# Patient Record
Sex: MALE | Race: WHITE
[De-identification: names, ages, dates, MRNs, and addresses within clinical notes are randomized per-mention and may not be internally consistent; named-entity substitution may affect disease eponyms.]

---

## 2017-07-12 NOTE — EKG
[facility]
                                    [address] [facility] [address]
                                  [place]  [address]
_________________________________________________________________________________________
                                                                 Signed   
 
 
Sinus rhythm with frequent premature ventricular complexes
Nonspecific ST abnormality
Abnormal ECG
When compared with ECG of 05-JUL-2017 10:16,
No significant change was found
Confirmed by BOBBY SOTO MD ([id number]) on 7/12/2017 6:23:32 PM
 
 
 
 
 
 
 
 
 
 
 
 
 
 
 
 
 
 
 
 
 
 
 
 
 
 
 
 
 
 
 
 
 
 
 
 
 
 
 
    Electronically Signed By: BOBBY SOTO MD  07/12/17 1823
_________________________________________________________________________________________
PATIENT NAME:     JUNERA LANDRY               
MEDICAL RECORD #: [id number]                     Electrocardiogram             
          ACCT #: [id number]  
DATE OF BIRTH:   [birth year]                                       
PHYSICIAN:   BOBBY SOTO MD           REPORT #: [id number]
REPORT IS CONFIDENTIAL AND NOT TO BE RELEASED WITHOUT AUTHORIZATION

## 2017-12-20 NOTE — OR
[facility]
                                    [address] [place], [place]  [address]
_________________________________________________________________________________________
                                                                 Signed   
 
 
DATE OF OPERATION:
12/19/2017
 
SURGEON:
Chidi Duke MD
 
PREOPERATIVE DIAGNOSIS:
End-stage osteoarthritis, right knee.
 
POSTOPERATIVE DIAGNOSIS:
End-stage osteoarthritis, right knee.
 
PROCEDURE:
Right total knee arthroplasty.
 
IMPLANTS:
Were an Attune PS femur size 7, size 7 cemented standard tibial tray with a size 7 PS 
poly insert, 41 mm patellar button.
 
ANESTHESIA:
Spinal with sedation.
 
SPECIMENS AND COMPLICATIONS:
There were no specimens or complications.
 
TOURNIQUET TIME:
90 minutes.
 
WHAT WAS DONE:
The patient was taken to the operating room, placed on the table in the supine position. 
After anesthesia was induced and the patient was sedated, the right lower extremity was 
positioned, prepped and draped in a routine sterile fashion.  A straight anterior 
approach was made centered over the patella and the tibial tubercle.  Skin was divided 
sharply.  A small medial flap was created and an anteromedial arthrotomy performed.  The 
patella was turned on edge and about 11 mm were trimmed off the posterior aspect of the 
patella.  The patella was then sized with a 41 mm lollipop and the drill holes were made 
for 41 mm patella.  The patellar trial was placed and we had completely reconstituted his
patellar height.  The trial was then removed.  The patella was slid into the lateral 
recess and the knee was gently flexed.  The Dragonplay navigation system was then attached 
to the distal femur and following the prompts, we digitized the distal femur.  Distal 
femur was then resected in 3 degrees of flexion, neutral varus and valgus, and taking 
about 11 mm off the medial side.  The femoral wafers were then removed.  We then 
 
    Electronically Signed By: CHIDI DUKE MD  12/20/17 1325
_________________________________________________________________________________________
PATIENT NAME:     RA WATKINS               
MEDICAL RECORD #: [id number]                     OPERATIVE REPORT              
          ACCT #: [id number]  
DATE OF BIRTH:   [birth year]                                       
PHYSICIAN:   CHIDI DUKE MD           REPORT #: [id number]
REPORT IS CONFIDENTIAL AND NOT TO BE RELEASED WITHOUT AUTHORIZATION
 
 
                                  [facility]
                                    [address] [place], [place]  [address]
_________________________________________________________________________________________
                                                                 Signed   
 
 
transitioned the navigation system to proximal tibia and again following the prompts, 
digitized the proximal tibia.  Proximal tibia was then resected in neutral varus valgus, 
removing about 5 mm off the medial side and placing the cut in 3 degrees of posterior 
slope per the Attune protocol.  Tibial wafer was then removed along with remnants of the 
medial and lateral meniscus, ACL and PCL.  The knee was then gently irrigated.  Lamina 
spreaders were placed.  We appeared to have good balance in all the remaining portions of
the medial meniscus.  Lateral meniscus, ACL and PCL were removed.  The femoral sizing jig
was then placed on the distal femur and sized to a size 7.  The 4-in-1 cutting block was 
placed on the distal femur and anterior, posterior, and chamfer cuts were made.  We then 
placed the notch cutting block on the distal femur and cut out the notch.  We were then 
able to place the distal femoral trial on the femur and drilled the holes for the lugs.  
We were then able to fit a size 7 tibial tray on the proximal tibia with a 5 mm poly.  
The knee was then cycled several times.  Rotational alignment of the tibial tray was 
marked.  The tibia was then prepared with a standard broach and reamer.  We then 
copiously irrigated and meticulously dried the knee.  The tibial tray and femoral 
component were then cemented into place.  Marginal cement was removed and a size 5 PS 
poly trial was placed on the tray and the knee was reduced.  The patella was then 
cemented into place as well and marginal cement trimmed.  Once all the cement had cured, 
we reexamined the knee.  Marginal cementophytes were removed.  We then found, we were 
actually able to place a 7 mm poly and we were happy with alignment and position and did 
not appear sacrifice any extension.  A 7 mm poly was then placed on the tibial tray and 
impacted into place.  Knee was copiously irrigated and closed in a routine fashion.  
Sterile dressing was applied.  The patient was awakened and taken to recovery room where 
he arrived in stable condition.  Counts were correct and antibiotic protocols were 
followed.
 
 
 
 
________________________________________
 
 
Chidi Duke MD 
 
 
 
[facility]/MODL
Job #:  [id number]
DD:  12/19/2017 10:03:05
DT:  12/19/2017 12:02:33
 
 
 
    Electronically Signed By: CHIDI DUKE MD  12/20/17 1325
_________________________________________________________________________________________
PATIENT NAME:     RA WATKINS               
MEDICAL RECORD #: [id number]                     OPERATIVE REPORT              
          ACCT #: [id number]  
DATE OF BIRTH:   [birth year]                                       
PHYSICIAN:   CHIDI DUKE MD           REPORT #: [id number]
REPORT IS CONFIDENTIAL AND NOT TO BE RELEASED WITHOUT AUTHORIZATION
 
 
                                  [address]  [address]
_________________________________________________________________________________________
                                                                 Signed   
 
 
cc:
Stef Mcnamara MD
 
 
 
 
 
 
 
 
 
 
 
 
 
 
 
 
 
 
 
 
 
 
 
 
 
 
 
 
 
 
 
 
 
 
 
 
 
 
 
 
 
    Electronically Signed By: CHIDI DUKE MD  12/20/17 1325
_________________________________________________________________________________________
PATIENT NAME:     CHAROERINRA LANDRY               
MEDICAL RECORD #: [id number]                     OPERATIVE REPORT              
          ACCT #: [id number]  
DATE OF BIRTH:   [birth year]                                       
PHYSICIAN:   CHIDI DUKE MD           REPORT #: [id number]
REPORT IS CONFIDENTIAL AND NOT TO BE RELEASED WITHOUT AUTHORIZATION

## 2017-12-22 NOTE — DS
[facility]
                                    [address] [facility]
                                  [place], [place]  [address]
_________________________________________________________________________________________
                                                                 Signed   
 
 
ADMISSION DATE:  12/19/2017
 
DISCHARGE DATE:  12/21/2017
 
FINAL DIAGNOSIS AT THE TIME OF DISCHARGE:
End-stage osteoarthritis, right knee.
 
PROCEDURE:
Right total knee arthroplasty.
 
HISTORY OF PRESENT ILLNESS:
The patient is a 67-year-old white male with a long history of osteoarthritis in both 
knees.  He has already undergone a successful left total knee arthroplasty and because of
ongoing pain and disability presents for an elective right total knee arthroplasty.
 
HOSPITAL COURSE:
He was admitted to day surgery on the 19th of December.  He was taken to the operating 
room, where he underwent an Attune posterior stabilized knee with a size #7 PS femoral 
component, size #7 tibial tray, a 7 mm poly insert, and a 41 mm all poly patellar button.
 
 
Postoperatively, he has done well.  He had no issues with nausea or vomiting.  He was 
able to void without difficulty and has been getting good pain control alternating 
Vicodin and Dilaudid.  He has been taking his Eliquis, which he was taking preoperatively
for DVT prophylaxis. 
 
As of today, he has passed all of his therapy milestones and is independently ambulatory 
and appears to be safe on stairs.  His incision is clean and dry, and he is getting 
excellent pain control and eating well.
 
PLAN:
I am discharging him home with a plan to start formal physical therapy again early next 
week.  We will see him back in about 4 weeks.
 
 
 
 
________________________________________
 
 
Shi Duke MD 
 
 
    Electronically Signed By: SHI DUKE MD  12/22/17 1006
_________________________________________________________________________________________
PATIENT NAME:     RA WATKINS               
MEDICAL RECORD #: [id number]                     DISCHARGE SUMMARY             
          ACCT #: [id number]  
DATE OF BIRTH:   [birth year]                                       
PHYSICIAN:   SHI DUKE MD           REPORT #: [id number]
REPORT IS CONFIDENTIAL AND NOT TO BE RELEASED WITHOUT AUTHORIZATION
 
 
                                  [address]
                                  [place]  [address]
_________________________________________________________________________________________
                                                                 Signed   
 
 
 
 
[facility]
Job #:  [id number]
DD:  12/21/2017 12:24:31
DT:  12/22/2017 07:55:33
 
 
 
 
 
 
 
 
 
 
 
 
 
 
 
 
 
 
 
 
 
 
 
 
 
 
 
 
 
 
 
 
 
 
 
 
 
    Electronically Signed By: SHI DUKE MD  12/22/17 1006
_________________________________________________________________________________________
PATIENT NAME:     RA WATKINS               
MEDICAL RECORD #: [id number]                     DISCHARGE SUMMARY             
          ACCT #: [id number]  
DATE OF BIRTH:   [birth year]                                       
PHYSICIAN:   SHI DUKE MD           REPORT #: [id number]
REPORT IS CONFIDENTIAL AND NOT TO BE RELEASED WITHOUT AUTHORIZATION

## 2021-01-25 ENCOUNTER — HOSPITAL ENCOUNTER (OUTPATIENT)
Dept: HOSPITAL 46 - OPS | Age: 71
Discharge: HOME | End: 2021-01-25
Attending: SURGERY
Payer: MEDICARE

## 2021-01-25 VITALS — BODY MASS INDEX: 26.71 KG/M2 | HEIGHT: 67 IN | WEIGHT: 170.2 LBS

## 2021-01-25 DIAGNOSIS — Z86.010: ICD-10-CM

## 2021-01-25 DIAGNOSIS — Z79.899: ICD-10-CM

## 2021-01-25 DIAGNOSIS — Z12.11: Primary | ICD-10-CM

## 2021-01-25 DIAGNOSIS — Z79.82: ICD-10-CM

## 2021-01-25 DIAGNOSIS — I12.9: ICD-10-CM

## 2021-01-25 DIAGNOSIS — Z98.1: ICD-10-CM

## 2021-01-25 DIAGNOSIS — Z85.828: ICD-10-CM

## 2021-01-25 DIAGNOSIS — N18.4: ICD-10-CM

## 2021-01-25 DIAGNOSIS — K57.30: ICD-10-CM

## 2021-01-25 DIAGNOSIS — I25.2: ICD-10-CM

## 2021-01-25 DIAGNOSIS — E11.22: ICD-10-CM

## 2021-01-25 DIAGNOSIS — I48.91: ICD-10-CM

## 2021-01-25 DIAGNOSIS — Z95.5: ICD-10-CM

## 2021-01-25 DIAGNOSIS — G25.0: ICD-10-CM

## 2021-01-25 DIAGNOSIS — Z96.653: ICD-10-CM

## 2021-01-25 DIAGNOSIS — Z79.01: ICD-10-CM

## 2021-01-25 PROCEDURE — G0500 MOD SEDAT ENDO SERVICE >5YRS: HCPCS

## 2021-01-25 PROCEDURE — 0DJD8ZZ INSPECTION OF LOWER INTESTINAL TRACT, VIA NATURAL OR ARTIFICIAL OPENING ENDOSCOPIC: ICD-10-PCS | Performed by: SURGERY

## 2021-01-25 NOTE — NUR
01/25/21 1554 El[address] Leon 1442 PT ARRIVED IN PACU AWAKE AND TALKING TO STAFF. BLOOD SUGAR 80.
1500 DR AT BEDSIDE TALKING TO PT. 1515 ABD SOFT AND PASSING FLATUS.
SITTING UP IN BED SIPPING ON WATER. 1525 PT DRESSED. DC INSTRUCTIONS
GIVEN AND ALL QUESTIONS ANSWERED. LEFT VIA W/C.

## 2021-01-25 NOTE — OR
[facility]
                                    [address] [place], [place]  [address]
_________________________________________________________________________________________
                                                                 Draft    
 
 
DATE OF OPERATION:
01/25/2021
 
SURGEON:
Blanquita Tong MD
 
PREOPERATIVE DIAGNOSIS:
Surveillance (history of polyp, 2016).
 
POSTOPERATIVE DIAGNOSIS:
Sigmoid diverticulosis.  No evidence of recurrent or new polyps.
 
PROCEDURE:
Total colonoscopy to cecum.
 
ANESTHESIA:
Intravenous sedation, fentanyl 100 mcg, Versed 3.5 mg.
 
INDICATIONS:
This 71-year-old white man is a patient Dr. Mcnamara has several medical problems.  This
includes myocardial infarction in 2015, ongoing atrial fibrillation with anticoagulation
with Eliquis and prior history of joint replacement therapy.  He underwent colonoscopy
in 2016, which showed a tubular adenoma.  He is symptom free and has no family history
of colon cancer.  He is admitted at this time to undergo colonoscopy.  He understands
the risks of bleeding, infection, and perforation. 
 
FINDINGS:
The prep was good.  Complete colonoscopy was undertaken to the cecum without question.
Intubation of the ileum was accomplished as well.  He had several diverticula of the
sigmoid colon, but no sign of polyp or other abnormality. 
 
DESCRIPTION OF PROCEDURE:
The patient was brought to the endoscopy suite and placed in lateral decubitus position.
 He underwent preoperative antibiotic administration of Ancef 2 g.  He was given
intravenous sedation with full cardiopulmonary monitoring __________ digital rectal
examination was normal.  An Olympus video colonoscope was passed in the rectum and
manipulated throughout the colon noting diverticulosis of the sigmoid and left colon.
Scope was ultimately passed to the cecum.  The ileocecal valve and appendiceal orifice
were normal.  Intubation of the ileum was easily accomplished and the ileum appeared
normal as well.  The scope was withdrawn and careful examination upon withdrawal of
scope showed only diverticulosis as previously described.  Retroflexed view was normal
as well.  The scope was removed and the patient was taken to the recovery room in good
 
                                                                                    
_________________________________________________________________________________________
PATIENT NAME:     RA WATKINS               
MEDICAL RECORD #: [id number]            OPERATIVE REPORT              
          ACCT #: [id number]  
DATE OF BIRTH:   [birth year]            REPORT #: [id number]      
PHYSICIAN:        BLANQUITA TONG MD                 
PCP:              RORY MCNAMARA MD           
REPORT IS CONFIDENTIAL AND NOT TO BE RELEASED WITHOUT AUTHORIZATION
 
 
                                  [facility]
                                    [address] [place], [place]  [address]
_________________________________________________________________________________________
                                                                 Draft    
 
 
condition. 
 
CONCLUDING DIAGNOSIS:
Diverticulosis of sigmoid, otherwise normal.
 
PLAN:
Recommend repeat colonoscopy in 10 years if clinically appropriate at that advanced age. 
Colonoscopy would be recommended sooner if symptoms should occur.  He will return to the
ongoing care of Dr. Mcnamara. 
 
 
 
            ________________________________________
            MD JV Ashley/MODL
Job #:  [id number]
DD:  01/25/2021 14:47:53
DT:  01/25/2021 17:42:39
 
cc:            Rory Mcnamara MD
 
 
Copies:  RORY MCNAMARA MD
~
 
 
 
 
 
 
 
 
 
 
 
 
 
 
 
 
 
                                                                                    
_________________________________________________________________________________________
PATIENT NAME:     RA WATKINS               
MEDICAL RECORD #: [id number]            OPERATIVE REPORT              
          ACCT #: [id number]  
DATE OF BIRTH:   [birth year]            REPORT #: [id number]      
PHYSICIAN:        BLANQUITA TONG MD                 
PCP:              RORY MCNAMARA MD           
REPORT IS CONFIDENTIAL AND NOT TO BE RELEASED WITHOUT AUTHORIZATION

## 2022-07-18 ENCOUNTER — HOSPITAL ENCOUNTER (OUTPATIENT)
Dept: HOSPITAL 46 - DS | Age: 72
Discharge: HOME | End: 2022-07-18
Attending: UROLOGY
Payer: MEDICARE

## 2022-07-18 VITALS — WEIGHT: 180.36 LBS | HEIGHT: 67 IN | BODY MASS INDEX: 28.31 KG/M2

## 2022-07-18 DIAGNOSIS — Z96.653: ICD-10-CM

## 2022-07-18 DIAGNOSIS — N99.115: Primary | ICD-10-CM

## 2022-07-18 DIAGNOSIS — N18.30: ICD-10-CM

## 2022-07-18 DIAGNOSIS — I25.10: ICD-10-CM

## 2022-07-18 DIAGNOSIS — I12.9: ICD-10-CM

## 2022-07-18 DIAGNOSIS — E11.22: ICD-10-CM

## 2022-07-18 DIAGNOSIS — Z85.828: ICD-10-CM

## 2022-07-18 DIAGNOSIS — N35.912: ICD-10-CM

## 2022-07-18 DIAGNOSIS — I25.2: ICD-10-CM

## 2022-07-18 DIAGNOSIS — J45.909: ICD-10-CM

## 2022-07-18 DIAGNOSIS — Z95.5: ICD-10-CM

## 2022-07-18 PROCEDURE — 0T7D8ZZ DILATION OF URETHRA, VIA NATURAL OR ARTIFICIAL OPENING ENDOSCOPIC: ICD-10-PCS | Performed by: UROLOGY

## 2022-07-18 NOTE — NUR
07/18/22 1203 Elba Leon 1114 PT ARRIVED IN PACU AWAKE WITH NO C/O'S. SCROTUM ELEVATED WITH
TOWELS AND CRAWFORD PRESENT WITH PINK COLORED URINE. 1130 PT TELLING
JOKES AND VISITING WITH STAFF. NO C/O'S. 1145 TO DS. REPORT GIVEN TO
FAIZAN.

## 2022-07-18 NOTE — NUR
PT ALERT,  ORIENTED AND SUPPORTED BY HIS WIFE JUNE. PT HAS TRIED TO HAVE THIS
SURGERY TWICE BEFORE AND HAS HANDLED SET BACKS REMARKABLY WELL. ALL QUESTIONS
ASKED ANSWERED. PT REQUESTED PRAYER, WILL FOLLOW

## 2022-07-18 NOTE — NUR
1145: PT ARRIVES TO UNIT FROM PACU VIA STRETCHER. AWAKE AND ALERT ON ARRIVAL.
VSS, RESP EVEN AND UNLABORED. NO DRAINAGE AT URETHRA, SIGNIFICANT SCROTAL
SWELLING. MD AWARE, ELEVATED AS ORDERED. CRAWFORD CATH DRAINS CLEAR PINK TINGED
URINE AT THE BEDSIDE. PT DENIES PAIN AND NAUSEA AT THIS TIME. CRACKERS AND ICE
WATER PROVIDED. POC DISCUSSED AND PT VOICES UNDERSTANDING. NO NEEDS AT THIS
TIME, CALL LIGHT WITHIN REACH

## 2022-07-18 NOTE — NUR
1245: PT AWAKE AND ALERT CONVERSING WITH WIFE IN STRETCHER. VSS, RESP EVEN AND
UNLABORED. WALTER PO INTAKE WELL. DENIES PAIN AND NAUSEA. NO DRAINAGE NOTED AT
URETHRA, SCROTUM REMAINS SWOLLEN, ELEVATED. CRAWFORD CATH DRAINED FOR 200ML PINK
TINGED URINE AND WIFE TAUGHT CATH CARE FOR DC. VOICES UNDERSTANDING. PT
DANGLED AT THE BEDSIDE. WALTER WELL. DRESSES INDEPENDENTLY AS THE BEDSIDE. SL
REMOVED WITH CATH TIP INTACT AND PRESSURE APPLIED TO SITE, WNL. DC
INSTRUCTIONS PROVIDED AND DISCUSSED AS ORDERED. PT VOICES UNDERSTANDING AND
DENIES QUESTIONS AND CONCERNS AT THIS TIME.
 
1310: WHEELED OFF OF UNIT IN  BY THIS RN FOR DC. TRANSFERS INTO VEHICLE
INDEPENDENTLY AND APPROPRIATELY. NO PHYSICAL S/S OF DISTRESS AT THIS TIME

## 2022-07-19 NOTE — OR
[facility]
                                    [address] [place], [place]  [address]
_________________________________________________________________________________________
                                                                 Signed   
 
 
DATE OF OPERATION:
07/18/2022
 
SURGEON:
Karen Das MD
 
PREOPERATIVE DIAGNOSES:
1. History of severe multifocal urethral stricture disease, status post two-stage
Johanson repair. 
2. Incidental finding of three distinct areas of erythema within the patient's bladder
on routine cystoscopy. 
 
POSTOPERATIVE DIAGNOSES:
1. History of severe multifocal urethral stricture disease, status post two-stage
Johanson repair. 
2. Incidental finding of three distinct areas of erythema within the patient's bladder
on routine cystoscopy. 
3. No evidence of well-defined erythema or any other lesion on today's cystoscopy.
 
PROCEDURES:
1. Urethroscopy with serial urethral dilation using Gladys dilators, from 10-Vincentian to
20-Vincentian. 
2. Diagnostic cystoscopy.
3. Insertion of 20-Vincentian two-way Corbett catheter over a Sensor wire.
 
ANESTHESIA:
LMA, general.
 
ESTIMATED BLOOD LOSS:
Minimal.
 
COMPLICATIONS:
The patient sustained a punctate perforation of the right lateral wall of the distal
urethra during passage of the 0.035 Sensor wire into the urethra.  Later in the
procedure, this did result in mild-to-moderate scrotal edema that was immediately noted
and managed with scrotal elevation. 
 
SPECIMENS:
None.
 
DRAINS:
A 20-Vincentian two-way Corbett catheter, connected to gravity drainage.
 
    Electronically Signed By: KAREN DAS MD  07/19/22 1916
_________________________________________________________________________________________
PATIENT NAME:     RA WATKINS               
MEDICAL RECORD #: [id number]            OPERATIVE REPORT              
          ACCT #: [id number]  
DATE OF BIRTH:   [birth year]            REPORT #: [id number]      
PHYSICIAN:        KAREN DAS MD               
PCP:              TRACI CHA MD           
REPORT IS CONFIDENTIAL AND NOT TO BE RELEASED WITHOUT AUTHORIZATION
 
 
                                  [facility]
                                    [address], [place]  [address]
_________________________________________________________________________________________
                                                                 Signed   
 
 
 
INDICATIONS FOR PROCEDURE:
Mr. Watkins is a very pleasant 72-year-old male with a complex urologic history.  He is
status post a two-stage Johanson repair after being diagnosed with multi-focal anterior
and bulbar urethral stricture disease.  Recent cystoscopy did also reveal a thin
moderate stricture in the fossa navicularis as well.  On recent cystoscopy, I was able
to pass a 17-Vincentian cystoscope through the urethra into the bladder.  At that time, I
was able to appreciate three distinct well-circumscribed areas of erythema that I could
not be confident were reactive without biopsy.  This was explained to the patient at the
time.  He elected to undergo cystoscopy with bladder biopsy and possible urethral
dilation to rule out possible carcinoma in situ as a cause of the lesions.  He presents
today for the aforementioned procedure. 
 
OPERATIVE FINDINGS:
1. Ureteroscopy again revealed a moderate caliber thin stricture present in the fossa
navicularis.  This was dissected from 10-Vincentian to 18-Vincentian in an effort to get the
remainder of the cystoscope it with sheath into the urethra. 
2. Ureteroscopy again revealed a longer stricture in the pendulous urethra.  I passed a
Sensor wire through this stricture and passed the wire into the bladder.  This stricture
was dilated from 10-Vincentian to 20-Vincentian without incident. 
3. Diagnostic cystoscopy was performed with a 17-Vincentian sheath.  Cystoscopy revealed a
no evidence of any abnormalities of the bladder wall or lesions or abdomen.  No evidence
of lesions or other abnormality of the bladder wall.  There were a couple of blood clots
present that are related to the urethral dilation.  Bilateral ureteral orifices are in
their normal anatomic location effluxing clear urine.  Again, there is no evidence of
any lesion or mass within the bladder. 
4. A 20-Vincentian Corbett catheter was passed over a Sensor wire and placed into the
patient's bladder at the end of the procedure.  This was connected to gravity drainage. 
 
DESCRIPTION OF PROCEDURE:
After informed consent was obtained, the patient was taken back to the operating room.
He was transferred from the [facility] to the operating room table, where general anesthesia
was induced.  He was placed in the dorsal lithotomy position, his genitalia were prepped
and draped in standard sterile fashion.  Using a 17-Vincentian sheath and a 30-degree lens,
I performed a distal urethroscopy and I did appreciate the fossa navicularis stricture.
Please see above findings.  The stricture was dilated using Gladys sounds.  I did
initially attempt to pass a wire through the navicularis stricture and down into the
bladder.  However, the wire did not go into the bladder, it went into the sidewall of
the urethra.  It was this point in time that I made decision to blindly pass the
Gladys's to dilate the fossa stricture, so that I could then pass the 17-Vincentian sheath
further down the urethra to dilate the proximal portion of the urethra.  The fossa
navicularis stricture dilated without any difficulty, so I was able to pass a 17-Vincentian
 
    Electronically Signed By: KAREN DAS MD  07/19/22 1916
_________________________________________________________________________________________
PATIENT NAME:     RA WATKINS               
MEDICAL RECORD #: [id number]            OPERATIVE REPORT              
          ACCT #: [id number]  
DATE OF BIRTH:   [birth year]            REPORT #: [id number]      
PHYSICIAN:        KAREN DAS MD               
PCP:              TRACI CHA MD           
REPORT IS CONFIDENTIAL AND NOT TO BE RELEASED WITHOUT AUTHORIZATION
 
 
                                  [address]  [address]
_________________________________________________________________________________________
                                                                 Signed   
 
 
sheath on a rigid cystoscope further into the urethra, this was where I encountered the
pendulous urethral stricture.  There was also noted a good deal of hair internally in
this area due to the previous grafting in this area.  So again, I passed the Sensor wire
through the stricture and into the bladder.  The stricture was then dilated from
10-Vincentian to 20-Vincentian with a little bit of resistance as I got to the higher Vincentian
dilators.  I then removed the wire and was able to easily pass a 17-Vincentian sheath
through the urethra, now into the bladder and I performed a thorough diagnostic
cystoscopy, please see above findings.  I then drained the patient's bladder and then
inserted a Sensor wire through the scope and into the patient's bladder and removed the
17-Vincentian sheath.  Over the wire, I passed a 20-Vincentian two-way Corbett catheter into the
patient's bladder.  Prior to passing the catheter, I allowed the catheter with a 0.05%
clobetasol cream as an anti-inflammatory.  The 20-Vincentian catheter passed with some
resistance.  However, I was able to successfully get the catheter and filled the balloon
with 10 mL of sterile water.  The catheter was confirmed to be in good position with
manual irrigation.  The catheter was then connected to gravity drainage.  Prior to this,
I did note the bilateral scrotal edema, which I believe is related to the perforation of
the lateral wall of the urethra during attempts at passing a Sensor wire into the
bladder.  This was noted immediately and the patient's scrotum were placed on continuous
elevation postoperatively.  The procedure was terminated.  The patient tolerated the
procedure well.  No complication.  He will now be transferred to the Postanesthesia Care
Unit in stable condition. 
 
DISPOSITION:
I discussed the details of today's procedure with the patient's wife and answered all of
her questions.  I did inform her that there was a punctate perforation on the lateral
wall of the distal urethra and this is likely the cause of the mild scrotal edema that
we see postoperatively.  I recommended that he maintain scrotal elevation for the next
week or so and it should resolve on its own.  He will be sent home with cefdinir 300 mg
p.o. b.i.d. for a total of 7 days along with oxycodone 5 mg p.o. q.6 hours p.r.n. pain,
dispense #20.  He will be scheduled to return in a couple of days to have his catheter
removed by the nurse.  He will also see me in October with a PVR for routine
postoperative evaluation. 
 
 
 
            ________________________________________
            Karen Das MD 
 
 
AR/MODL
Job #:  [id number]
DD:  07/18/2022 12:10:42
 
    Electronically Signed By: KAREN DAS MD  07/19/22 1916
_________________________________________________________________________________________
PATIENT NAME:     RA WATKINS               
MEDICAL RECORD #: [id number]            OPERATIVE REPORT              
          ACCT #: [id number]  
DATE OF BIRTH:   [birth year]            REPORT #: [id number]      
PHYSICIAN:        KAREN DAS MD               
PCP:              TRACI CHA MD           
REPORT IS CONFIDENTIAL AND NOT TO BE RELEASED WITHOUT AUTHORIZATION
 
 
                                  [address]  [address]
_________________________________________________________________________________________
                                                                 Signed   
 
 
DT:  07/18/2022 14:19:17
 
 
Copies:                                
~
 
 
 
 
 
 
 
 
 
 
 
 
 
 
 
 
 
 
 
 
 
 
 
 
 
 
 
 
 
 
 
 
 
 
 
 
 
 
    Electronically Signed By: KAREN DAS MD  07/19/22 1916
_________________________________________________________________________________________
PATIENT NAME:     RA WATKINS               
MEDICAL RECORD #: [id number]            OPERATIVE REPORT              
          ACCT #: [id number]  
DATE OF BIRTH:   [birth year]            REPORT #: [id number]      
PHYSICIAN:        KAREN DAS MD               
PCP:              TRACI CHA MD           
REPORT IS CONFIDENTIAL AND NOT TO BE RELEASED WITHOUT AUTHORIZATION